# Patient Record
Sex: MALE | URBAN - METROPOLITAN AREA
[De-identification: names, ages, dates, MRNs, and addresses within clinical notes are randomized per-mention and may not be internally consistent; named-entity substitution may affect disease eponyms.]

---

## 2020-10-05 ENCOUNTER — APPOINTMENT (RX ONLY)
Dept: URBAN - METROPOLITAN AREA CLINIC 27 | Facility: CLINIC | Age: 75
Setting detail: DERMATOLOGY
End: 2020-10-05

## 2020-10-05 DIAGNOSIS — L0390 CELLULITIS AND ABSCESS OF UNSPECIFIED SITES: ICD-10-CM

## 2020-10-05 DIAGNOSIS — L82.0 INFLAMED SEBORRHEIC KERATOSIS: ICD-10-CM

## 2020-10-05 DIAGNOSIS — L0391 CELLULITIS AND ABSCESS OF UNSPECIFIED SITES: ICD-10-CM

## 2020-10-05 PROBLEM — L02.412 CUTANEOUS ABSCESS OF LEFT AXILLA: Status: ACTIVE | Noted: 2020-10-05

## 2020-10-05 PROCEDURE — ? LIQUID NITROGEN

## 2020-10-05 PROCEDURE — 17110 DESTRUCTION B9 LES UP TO 14: CPT

## 2020-10-05 PROCEDURE — ? MEDICATION COUNSELING

## 2020-10-05 PROCEDURE — ? COUNSELING

## 2020-10-05 PROCEDURE — 99212 OFFICE O/P EST SF 10 MIN: CPT | Mod: 25

## 2020-10-05 PROCEDURE — ? PRESCRIPTION

## 2020-10-05 RX ORDER — CEFADROXIL 500 MG/1
500 CAPSULE ORAL BID
Qty: 20 | Refills: 0 | Status: ERX | COMMUNITY
Start: 2020-10-05

## 2020-10-05 RX ADMIN — CEFADROXIL 500: 500 CAPSULE ORAL at 00:00

## 2020-10-05 ASSESSMENT — SEVERITY ASSESSMENT: SEVERITY: MODERATE TO SEVERE

## 2020-10-05 ASSESSMENT — LOCATION SIMPLE DESCRIPTION DERM
LOCATION SIMPLE: LEFT AXILLARY VAULT
LOCATION SIMPLE: LEFT AXILLARY VAULT
LOCATION SIMPLE: LEFT SCALP
LOCATION SIMPLE: SUPERIOR FOREHEAD

## 2020-10-05 ASSESSMENT — LOCATION DETAILED DESCRIPTION DERM
LOCATION DETAILED: LEFT AXILLARY VAULT
LOCATION DETAILED: SUPERIOR MID FOREHEAD
LOCATION DETAILED: LEFT MEDIAL FRONTAL SCALP
LOCATION DETAILED: LEFT AXILLARY VAULT

## 2020-10-05 ASSESSMENT — LOCATION ZONE DERM
LOCATION ZONE: AXILLAE
LOCATION ZONE: SCALP
LOCATION ZONE: FACE
LOCATION ZONE: AXILLAE

## 2020-10-05 NOTE — PROCEDURE: MEDICATION COUNSELING
no Opioid Counseling: I discussed with the patient the potential side effects of opioids including but not limited to addiction, altered mental status, and depression. I stressed avoiding alcohol, benzodiazepines, muscle relaxants and sleep aids unless specifically okayed by a physician. The patient verbalized understanding of the proper use and possible adverse effects of opioids. All of the patient's questions and concerns were addressed. They were instructed to flush the remaining pills down the toilet if they did not need them for pain.

## 2020-10-05 NOTE — HPI: CYST
How Severe Is Your Cyst?: moderate
Is This A New Presentation, Or A Follow-Up?: Cysts
Additional History: Years ago had cyst on the right axilla excised.

## 2020-10-05 NOTE — PROCEDURE: LIQUID NITROGEN
Medical Necessity Clause: This procedure was medically necessary because the lesions that were treated were:
Render Post-Care Instructions In Note?: yes
Post-Care Instructions: I reviewed with the patient in detail post-care instructions. Patient is to wear sunprotection, and avoid picking at any of the treated lesions. Pt may apply Vaseline to crusted or scabbing areas.
Add 52 Modifier (Optional): no
Medical Necessity Information: It is in your best interest to select a reason for this procedure from the list below. All of these items fulfill various CMS LCD requirements except the new and changing color options.
Detail Level: Detailed
Consent: The patient's consent was obtained including but not limited to risks of crusting, scabbing, blistering, scarring, darker or lighter pigmentary change, recurrence, incomplete removal and infection.

## 2020-10-05 NOTE — PROCEDURE: MEDICATION COUNSELING
Xelgraciez Pregnancy And Lactation Text: This medication is Pregnancy Category D and is not considered safe during pregnancy.  The risk during breast feeding is also uncertain.